# Patient Record
Sex: MALE | Race: WHITE | URBAN - METROPOLITAN AREA
[De-identification: names, ages, dates, MRNs, and addresses within clinical notes are randomized per-mention and may not be internally consistent; named-entity substitution may affect disease eponyms.]

---

## 2018-04-01 ASSESSMENT — MIFFLIN-ST. JEOR: SCORE: 2016.06

## 2018-04-02 ENCOUNTER — ANESTHESIA - HEALTHEAST (OUTPATIENT)
Dept: SURGERY | Facility: HOSPITAL | Age: 53
End: 2018-04-02

## 2018-04-02 ENCOUNTER — SURGERY - HEALTHEAST (OUTPATIENT)
Dept: SURGERY | Facility: HOSPITAL | Age: 53
End: 2018-04-02

## 2018-04-04 ENCOUNTER — COMMUNICATION - HEALTHEAST (OUTPATIENT)
Dept: ADMINISTRATIVE | Facility: CLINIC | Age: 53
End: 2018-04-04

## 2018-04-05 ENCOUNTER — HOME CARE/HOSPICE - HEALTHEAST (OUTPATIENT)
Dept: HOME HEALTH SERVICES | Facility: HOME HEALTH | Age: 53
End: 2018-04-05

## 2018-04-06 ENCOUNTER — HOME INFUSION (PRE-WILLOW HOME INFUSION) (OUTPATIENT)
Dept: PHARMACY | Facility: CLINIC | Age: 53
End: 2018-04-06

## 2018-04-06 ENCOUNTER — RECORDS - HEALTHEAST (OUTPATIENT)
Dept: ADMINISTRATIVE | Facility: OTHER | Age: 53
End: 2018-04-06

## 2018-04-09 ENCOUNTER — RECORDS - HEALTHEAST (OUTPATIENT)
Dept: ADMINISTRATIVE | Facility: OTHER | Age: 53
End: 2018-04-09

## 2018-04-09 ENCOUNTER — HOME INFUSION (PRE-WILLOW HOME INFUSION) (OUTPATIENT)
Dept: PHARMACY | Facility: CLINIC | Age: 53
End: 2018-04-09

## 2018-04-09 ENCOUNTER — INFUSION - HEALTHEAST (OUTPATIENT)
Dept: INFUSION THERAPY | Facility: CLINIC | Age: 53
End: 2018-04-09

## 2018-04-09 DIAGNOSIS — L03.115 CELLULITIS OF RIGHT LOWER EXTREMITY: ICD-10-CM

## 2018-04-09 LAB
ALBUMIN SERPL-MCNC: 3.3 G/DL (ref 3.5–5)
ALP SERPL-CCNC: 96 U/L (ref 45–120)
ALT SERPL W P-5'-P-CCNC: 52 U/L (ref 0–45)
ANION GAP SERPL CALCULATED.3IONS-SCNC: 9 MMOL/L (ref 5–18)
AST SERPL W P-5'-P-CCNC: 31 U/L (ref 0–40)
BASOPHILS # BLD AUTO: 0.1 THOU/UL (ref 0–0.2)
BASOPHILS NFR BLD AUTO: 1 % (ref 0–2)
BILIRUB DIRECT SERPL-MCNC: 0.1 MG/DL
BILIRUB SERPL-MCNC: 0.5 MG/DL (ref 0–1)
BUN SERPL-MCNC: 12 MG/DL (ref 8–22)
C REACTIVE PROTEIN LHE: 2.4 MG/DL (ref 0–0.8)
CALCIUM SERPL-MCNC: 9.5 MG/DL (ref 8.5–10.5)
CHLORIDE BLD-SCNC: 102 MMOL/L (ref 98–107)
CO2 SERPL-SCNC: 25 MMOL/L (ref 22–31)
CREAT SERPL-MCNC: 0.83 MG/DL (ref 0.7–1.3)
EOSINOPHIL # BLD AUTO: 0.2 THOU/UL (ref 0–0.4)
EOSINOPHIL NFR BLD AUTO: 4 % (ref 0–6)
ERYTHROCYTE [DISTWIDTH] IN BLOOD BY AUTOMATED COUNT: 12.4 % (ref 11–14.5)
GFR SERPL CREATININE-BSD FRML MDRD: >60 ML/MIN/1.73M2
GLUCOSE BLD-MCNC: 268 MG/DL (ref 70–125)
HCT VFR BLD AUTO: 35.3 % (ref 40–54)
HGB BLD-MCNC: 12.3 G/DL (ref 14–18)
LYMPHOCYTES # BLD AUTO: 1.1 THOU/UL (ref 0.8–4.4)
LYMPHOCYTES NFR BLD AUTO: 21 % (ref 20–40)
MCH RBC QN AUTO: 31.1 PG (ref 27–34)
MCHC RBC AUTO-ENTMCNC: 34.8 G/DL (ref 32–36)
MCV RBC AUTO: 89 FL (ref 80–100)
MONOCYTES # BLD AUTO: 0.6 THOU/UL (ref 0–0.9)
MONOCYTES NFR BLD AUTO: 12 % (ref 2–10)
NEUTROPHILS # BLD AUTO: 3.3 THOU/UL (ref 2–7.7)
NEUTROPHILS NFR BLD AUTO: 62 % (ref 50–70)
PLATELET # BLD AUTO: 211 THOU/UL (ref 140–440)
PMV BLD AUTO: 10.1 FL (ref 8.5–12.5)
POTASSIUM BLD-SCNC: 4.1 MMOL/L (ref 3.5–5)
PROT SERPL-MCNC: 7.1 G/DL (ref 6–8)
RBC # BLD AUTO: 3.96 MILL/UL (ref 4.4–6.2)
SODIUM SERPL-SCNC: 136 MMOL/L (ref 136–145)
VANCOMYCIN SERPL-MCNC: 9.6 UG/ML
WBC: 5.3 THOU/UL (ref 4–11)

## 2018-04-09 NOTE — PROGRESS NOTES
This is a recent snapshot of the patient's [facility] medical record.  For current drug dose and complete information and questions, call [phone] or In Basket pool, fv home infusion ([id number])  CSN Number:  [id number]

## 2018-04-10 NOTE — PROGRESS NOTES
This is a recent snapshot of the patient's [facility] medical record.  For current drug dose and complete information and questions, call [phone] or In Basket pool, fv home infusion ([id number])  CSN Number:  [id number]

## 2018-04-12 ENCOUNTER — INFUSION - HEALTHEAST (OUTPATIENT)
Dept: INFUSION THERAPY | Facility: CLINIC | Age: 53
End: 2018-04-12

## 2018-04-12 ENCOUNTER — COMMUNICATION - HEALTHEAST (OUTPATIENT)
Dept: INFECTIOUS DISEASES | Facility: CLINIC | Age: 53
End: 2018-04-12

## 2018-04-12 DIAGNOSIS — L03.90 CELLULITIS: ICD-10-CM

## 2018-04-12 LAB
ALBUMIN SERPL-MCNC: 3.6 G/DL (ref 3.5–5)
ALP SERPL-CCNC: 116 U/L (ref 45–120)
ALT SERPL W P-5'-P-CCNC: 38 U/L (ref 0–45)
ANION GAP SERPL CALCULATED.3IONS-SCNC: 12 MMOL/L (ref 5–18)
AST SERPL W P-5'-P-CCNC: 14 U/L (ref 0–40)
BASOPHILS # BLD AUTO: 0.1 THOU/UL (ref 0–0.2)
BASOPHILS NFR BLD AUTO: 1 % (ref 0–2)
BILIRUB DIRECT SERPL-MCNC: <0.1 MG/DL
BILIRUB SERPL-MCNC: 0.3 MG/DL (ref 0–1)
BUN SERPL-MCNC: 10 MG/DL (ref 8–22)
C REACTIVE PROTEIN LHE: 0.8 MG/DL (ref 0–0.8)
CALCIUM SERPL-MCNC: 9.9 MG/DL (ref 8.5–10.5)
CHLORIDE BLD-SCNC: 101 MMOL/L (ref 98–107)
CO2 SERPL-SCNC: 23 MMOL/L (ref 22–31)
CREAT SERPL-MCNC: 0.97 MG/DL (ref 0.7–1.3)
EOSINOPHIL # BLD AUTO: 0.2 THOU/UL (ref 0–0.4)
EOSINOPHIL NFR BLD AUTO: 4 % (ref 0–6)
ERYTHROCYTE [DISTWIDTH] IN BLOOD BY AUTOMATED COUNT: 12.2 % (ref 11–14.5)
GFR SERPL CREATININE-BSD FRML MDRD: >60 ML/MIN/1.73M2
GLUCOSE BLD-MCNC: 453 MG/DL (ref 70–125)
HCT VFR BLD AUTO: 39.5 % (ref 40–54)
HGB BLD-MCNC: 13.6 G/DL (ref 14–18)
LYMPHOCYTES # BLD AUTO: 1.1 THOU/UL (ref 0.8–4.4)
LYMPHOCYTES NFR BLD AUTO: 21 % (ref 20–40)
MCH RBC QN AUTO: 30.8 PG (ref 27–34)
MCHC RBC AUTO-ENTMCNC: 34.4 G/DL (ref 32–36)
MCV RBC AUTO: 90 FL (ref 80–100)
MONOCYTES # BLD AUTO: 0.5 THOU/UL (ref 0–0.9)
MONOCYTES NFR BLD AUTO: 10 % (ref 2–10)
NEUTROPHILS # BLD AUTO: 3.4 THOU/UL (ref 2–7.7)
NEUTROPHILS NFR BLD AUTO: 64 % (ref 50–70)
PLATELET # BLD AUTO: 255 THOU/UL (ref 140–440)
PMV BLD AUTO: 10.3 FL (ref 8.5–12.5)
POTASSIUM BLD-SCNC: 4.7 MMOL/L (ref 3.5–5)
PROT SERPL-MCNC: 7.6 G/DL (ref 6–8)
RBC # BLD AUTO: 4.41 MILL/UL (ref 4.4–6.2)
SODIUM SERPL-SCNC: 136 MMOL/L (ref 136–145)
VANCOMYCIN SERPL-MCNC: 14.6 UG/ML
WBC: 5.3 THOU/UL (ref 4–11)

## 2018-04-13 ENCOUNTER — HOME INFUSION (PRE-WILLOW HOME INFUSION) (OUTPATIENT)
Dept: PHARMACY | Facility: CLINIC | Age: 53
End: 2018-04-13

## 2018-04-16 ENCOUNTER — OFFICE VISIT - HEALTHEAST (OUTPATIENT)
Dept: PODIATRY | Facility: CLINIC | Age: 53
End: 2018-04-16

## 2018-04-16 DIAGNOSIS — E10.621 TYPE 1 DIABETES MELLITUS WITH RIGHT DIABETIC FOOT ULCER (H): ICD-10-CM

## 2018-04-16 DIAGNOSIS — L97.519 TYPE 1 DIABETES MELLITUS WITH RIGHT DIABETIC FOOT ULCER (H): ICD-10-CM

## 2018-04-17 NOTE — PROGRESS NOTES
This is a recent snapshot of the patient's [facility] medical record.  For current drug dose and complete information and questions, call [phone] or In Basket pool, fv home infusion ([id number])  CSN Number:  [id number]

## 2018-04-18 ENCOUNTER — RECORDS - HEALTHEAST (OUTPATIENT)
Dept: ADMINISTRATIVE | Facility: OTHER | Age: 53
End: 2018-04-18

## 2018-04-18 ENCOUNTER — OFFICE VISIT - HEALTHEAST (OUTPATIENT)
Dept: INFECTIOUS DISEASES | Facility: CLINIC | Age: 53
End: 2018-04-18

## 2018-04-18 ENCOUNTER — HOME INFUSION (PRE-WILLOW HOME INFUSION) (OUTPATIENT)
Dept: PHARMACY | Facility: CLINIC | Age: 53
End: 2018-04-18

## 2018-04-18 DIAGNOSIS — Z51.81 THERAPEUTIC DRUG MONITORING: ICD-10-CM

## 2018-04-18 DIAGNOSIS — M86.171 OSTEOMYELITIS OF FOOT, RIGHT, ACUTE (H): ICD-10-CM

## 2018-04-18 DIAGNOSIS — Z79.2 LONG TERM (CURRENT) USE OF ANTIBIOTICS: ICD-10-CM

## 2018-04-18 ASSESSMENT — MIFFLIN-ST. JEOR: SCORE: 2016.06

## 2018-04-19 ENCOUNTER — COMMUNICATION - HEALTHEAST (OUTPATIENT)
Dept: ADMINISTRATIVE | Facility: CLINIC | Age: 53
End: 2018-04-19

## 2018-04-19 ENCOUNTER — RECORDS - HEALTHEAST (OUTPATIENT)
Dept: ADMINISTRATIVE | Facility: OTHER | Age: 53
End: 2018-04-19

## 2018-04-19 ENCOUNTER — INFUSION - HEALTHEAST (OUTPATIENT)
Dept: INFUSION THERAPY | Facility: CLINIC | Age: 53
End: 2018-04-19

## 2018-04-19 DIAGNOSIS — L03.115 CELLULITIS OF RIGHT LOWER EXTREMITY: ICD-10-CM

## 2018-04-19 DIAGNOSIS — L03.90 CELLULITIS: ICD-10-CM

## 2018-04-19 LAB
ALBUMIN SERPL-MCNC: 3.7 G/DL (ref 3.5–5)
ALP SERPL-CCNC: 123 U/L (ref 45–120)
ALT SERPL W P-5'-P-CCNC: 30 U/L (ref 0–45)
ANION GAP SERPL CALCULATED.3IONS-SCNC: 14 MMOL/L (ref 5–18)
AST SERPL W P-5'-P-CCNC: 12 U/L (ref 0–40)
BASOPHILS # BLD AUTO: 0.1 THOU/UL (ref 0–0.2)
BASOPHILS NFR BLD AUTO: 2 % (ref 0–2)
BILIRUB DIRECT SERPL-MCNC: <0.1 MG/DL
BILIRUB SERPL-MCNC: 0.4 MG/DL (ref 0–1)
BUN SERPL-MCNC: 14 MG/DL (ref 8–22)
C REACTIVE PROTEIN LHE: 0.4 MG/DL (ref 0–0.8)
CALCIUM SERPL-MCNC: 9.7 MG/DL (ref 8.5–10.5)
CHLORIDE BLD-SCNC: 101 MMOL/L (ref 98–107)
CO2 SERPL-SCNC: 20 MMOL/L (ref 22–31)
CREAT SERPL-MCNC: 0.96 MG/DL (ref 0.7–1.3)
EOSINOPHIL # BLD AUTO: 0.3 THOU/UL (ref 0–0.4)
EOSINOPHIL NFR BLD AUTO: 6 % (ref 0–6)
ERYTHROCYTE [DISTWIDTH] IN BLOOD BY AUTOMATED COUNT: 12.1 % (ref 11–14.5)
GFR SERPL CREATININE-BSD FRML MDRD: >60 ML/MIN/1.73M2
GLUCOSE BLD-MCNC: 415 MG/DL (ref 70–125)
HCT VFR BLD AUTO: 40.1 % (ref 40–54)
HGB BLD-MCNC: 14.2 G/DL (ref 14–18)
LYMPHOCYTES # BLD AUTO: 1.2 THOU/UL (ref 0.8–4.4)
LYMPHOCYTES NFR BLD AUTO: 23 % (ref 20–40)
MCH RBC QN AUTO: 31.1 PG (ref 27–34)
MCHC RBC AUTO-ENTMCNC: 35.4 G/DL (ref 32–36)
MCV RBC AUTO: 88 FL (ref 80–100)
MONOCYTES # BLD AUTO: 0.5 THOU/UL (ref 0–0.9)
MONOCYTES NFR BLD AUTO: 9 % (ref 2–10)
NEUTROPHILS # BLD AUTO: 3.3 THOU/UL (ref 2–7.7)
NEUTROPHILS NFR BLD AUTO: 61 % (ref 50–70)
PLATELET # BLD AUTO: 240 THOU/UL (ref 140–440)
PMV BLD AUTO: 10.5 FL (ref 8.5–12.5)
POTASSIUM BLD-SCNC: 4.1 MMOL/L (ref 3.5–5)
PROT SERPL-MCNC: 7.6 G/DL (ref 6–8)
RBC # BLD AUTO: 4.56 MILL/UL (ref 4.4–6.2)
SODIUM SERPL-SCNC: 135 MMOL/L (ref 136–145)
VANCOMYCIN SERPL-MCNC: 14 UG/ML
WBC: 5.5 THOU/UL (ref 4–11)

## 2018-04-19 NOTE — PROGRESS NOTES
This is a recent snapshot of the patient's [facility] medical record.  For current drug dose and complete information and questions, call [phone] or In [facility] pool, fv home infusion ([id number])  CSN Number:  [id number]

## 2018-04-20 ENCOUNTER — RECORDS - HEALTHEAST (OUTPATIENT)
Dept: ADMINISTRATIVE | Facility: OTHER | Age: 53
End: 2018-04-20

## 2018-04-20 ENCOUNTER — HOME INFUSION (PRE-WILLOW HOME INFUSION) (OUTPATIENT)
Dept: PHARMACY | Facility: CLINIC | Age: 53
End: 2018-04-20

## 2018-04-23 NOTE — PROGRESS NOTES
This is a recent snapshot of the patient's [facility] medical record.  For current drug dose and complete information and questions, call [phone] or In [facility] pool, fv home infusion ([id number])  CSN Number:  [id number]

## 2018-04-26 ENCOUNTER — INFUSION - HEALTHEAST (OUTPATIENT)
Dept: INFUSION THERAPY | Facility: CLINIC | Age: 53
End: 2018-04-26

## 2018-04-26 DIAGNOSIS — L03.115 CELLULITIS OF RIGHT LEG: ICD-10-CM

## 2018-04-26 LAB
ALBUMIN SERPL-MCNC: 3.9 G/DL (ref 3.5–5)
ALP SERPL-CCNC: 113 U/L (ref 45–120)
ALT SERPL W P-5'-P-CCNC: 22 U/L (ref 0–45)
ANION GAP SERPL CALCULATED.3IONS-SCNC: 12 MMOL/L (ref 5–18)
AST SERPL W P-5'-P-CCNC: 13 U/L (ref 0–40)
BASOPHILS # BLD AUTO: 0.1 THOU/UL (ref 0–0.2)
BASOPHILS NFR BLD AUTO: 1 % (ref 0–2)
BILIRUB DIRECT SERPL-MCNC: <0.1 MG/DL
BILIRUB SERPL-MCNC: 0.4 MG/DL (ref 0–1)
BUN SERPL-MCNC: 13 MG/DL (ref 8–22)
C REACTIVE PROTEIN LHE: 0.4 MG/DL (ref 0–0.8)
CALCIUM SERPL-MCNC: 10.1 MG/DL (ref 8.5–10.5)
CHLORIDE BLD-SCNC: 102 MMOL/L (ref 98–107)
CO2 SERPL-SCNC: 19 MMOL/L (ref 22–31)
CREAT SERPL-MCNC: 0.89 MG/DL (ref 0.7–1.3)
EOSINOPHIL # BLD AUTO: 0.3 THOU/UL (ref 0–0.4)
EOSINOPHIL NFR BLD AUTO: 4 % (ref 0–6)
ERYTHROCYTE [DISTWIDTH] IN BLOOD BY AUTOMATED COUNT: 12.3 % (ref 11–14.5)
GFR SERPL CREATININE-BSD FRML MDRD: >60 ML/MIN/1.73M2
GLUCOSE BLD-MCNC: 324 MG/DL (ref 70–125)
HCT VFR BLD AUTO: 43.9 % (ref 40–54)
HGB BLD-MCNC: 15.4 G/DL (ref 14–18)
LYMPHOCYTES # BLD AUTO: 1.7 THOU/UL (ref 0.8–4.4)
LYMPHOCYTES NFR BLD AUTO: 24 % (ref 20–40)
MCH RBC QN AUTO: 31.1 PG (ref 27–34)
MCHC RBC AUTO-ENTMCNC: 35.1 G/DL (ref 32–36)
MCV RBC AUTO: 89 FL (ref 80–100)
MONOCYTES # BLD AUTO: 0.7 THOU/UL (ref 0–0.9)
MONOCYTES NFR BLD AUTO: 11 % (ref 2–10)
NEUTROPHILS # BLD AUTO: 4.2 THOU/UL (ref 2–7.7)
NEUTROPHILS NFR BLD AUTO: 60 % (ref 50–70)
PLATELET # BLD AUTO: 255 THOU/UL (ref 140–440)
PMV BLD AUTO: 10.4 FL (ref 8.5–12.5)
POTASSIUM BLD-SCNC: 4.7 MMOL/L (ref 3.5–5)
PROT SERPL-MCNC: 7.8 G/DL (ref 6–8)
RBC # BLD AUTO: 4.95 MILL/UL (ref 4.4–6.2)
SODIUM SERPL-SCNC: 133 MMOL/L (ref 136–145)
VANCOMYCIN SERPL-MCNC: 18.1 UG/ML
WBC: 7 THOU/UL (ref 4–11)

## 2018-04-27 ENCOUNTER — HOME INFUSION (PRE-WILLOW HOME INFUSION) (OUTPATIENT)
Dept: PHARMACY | Facility: CLINIC | Age: 53
End: 2018-04-27

## 2018-04-30 ENCOUNTER — AMBULATORY - HEALTHEAST (OUTPATIENT)
Dept: INFECTIOUS DISEASES | Facility: CLINIC | Age: 53
End: 2018-04-30

## 2018-04-30 ENCOUNTER — INFUSION - HEALTHEAST (OUTPATIENT)
Dept: INFUSION THERAPY | Facility: CLINIC | Age: 53
End: 2018-04-30

## 2018-04-30 DIAGNOSIS — M86.9 OSTEOMYELITIS, UNSPECIFIED SITE, UNSPECIFIED TYPE (H): ICD-10-CM

## 2018-04-30 DIAGNOSIS — L03.115 CELLULITIS OF RIGHT LEG: ICD-10-CM

## 2018-05-03 ENCOUNTER — OFFICE VISIT - HEALTHEAST (OUTPATIENT)
Dept: VASCULAR SURGERY | Facility: CLINIC | Age: 53
End: 2018-05-03

## 2018-05-03 DIAGNOSIS — L97.511 DIABETIC ULCER OF RIGHT FOOT ASSOCIATED WITH TYPE 1 DIABETES MELLITUS, LIMITED TO BREAKDOWN OF SKIN, UNSPECIFIED PART OF FOOT (H): ICD-10-CM

## 2018-05-03 DIAGNOSIS — E10.621 DIABETIC ULCER OF RIGHT FOOT ASSOCIATED WITH TYPE 1 DIABETES MELLITUS, LIMITED TO BREAKDOWN OF SKIN, UNSPECIFIED PART OF FOOT (H): ICD-10-CM

## 2018-05-03 NOTE — PROGRESS NOTES
This is a recent snapshot of the patient's [facility] medical record.  For current drug dose and complete information and questions, call [phone] or In Basket pool, fv home infusion ([id number])  CSN Number:  [id number]

## 2018-05-04 ENCOUNTER — COMMUNICATION - HEALTHEAST (OUTPATIENT)
Dept: ADMINISTRATIVE | Facility: CLINIC | Age: 53
End: 2018-05-04

## 2018-05-04 ENCOUNTER — COMMUNICATION - HEALTHEAST (OUTPATIENT)
Dept: VASCULAR SURGERY | Facility: CLINIC | Age: 53
End: 2018-05-04

## 2018-05-08 ENCOUNTER — RECORDS - HEALTHEAST (OUTPATIENT)
Dept: ADMINISTRATIVE | Facility: OTHER | Age: 53
End: 2018-05-08

## 2018-05-08 ENCOUNTER — HOME INFUSION (PRE-WILLOW HOME INFUSION) (OUTPATIENT)
Dept: PHARMACY | Facility: CLINIC | Age: 53
End: 2018-05-08

## 2018-05-09 ENCOUNTER — HOME INFUSION (PRE-WILLOW HOME INFUSION) (OUTPATIENT)
Dept: PHARMACY | Facility: CLINIC | Age: 53
End: 2018-05-09

## 2018-05-10 ENCOUNTER — HOME INFUSION (PRE-WILLOW HOME INFUSION) (OUTPATIENT)
Dept: PHARMACY | Facility: CLINIC | Age: 53
End: 2018-05-10

## 2018-05-10 NOTE — PROGRESS NOTES
This is a recent snapshot of the patient's [facility] medical record.  For current drug dose and complete information and questions, call [phone] or In Basket pool, fv home infusion ([id number])  CSN Number:  [id number]

## 2018-05-10 NOTE — PROGRESS NOTES
This is a recent snapshot of the patient's [facility] medical record.  For current drug dose and complete information and questions, call [phone] or In Basket pool, fv home infusion ([id number])  CSN Number:  [id number]

## 2018-05-14 ENCOUNTER — HOME INFUSION (PRE-WILLOW HOME INFUSION) (OUTPATIENT)
Dept: PHARMACY | Facility: CLINIC | Age: 53
End: 2018-05-14

## 2018-05-15 ENCOUNTER — COMMUNICATION - HEALTHEAST (OUTPATIENT)
Dept: PODIATRY | Facility: CLINIC | Age: 53
End: 2018-05-15

## 2018-05-15 ENCOUNTER — HOME INFUSION (PRE-WILLOW HOME INFUSION) (OUTPATIENT)
Dept: PHARMACY | Facility: CLINIC | Age: 53
End: 2018-05-15

## 2018-05-15 NOTE — PROGRESS NOTES
This is a recent snapshot of the patient's [facility] medical record.  For current drug dose and complete information and questions, call [phone] or In Basket pool, fv home infusion ([id number])  CSN Number:  [id number]

## 2018-05-16 NOTE — PROGRESS NOTES
This is a recent snapshot of the patient's [facility] medical record.  For current drug dose and complete information and questions, call [phone] or In [facility] pool, fv home infusion ([id number])  CSN Number:  [id number]

## 2018-06-01 NOTE — PROGRESS NOTES
This is a recent snapshot of the patient's [facility] medical record.  For current drug dose and complete information and questions, call [phone] or In Basket pool, fv home infusion ([id number])  CSN Number:  [id number]

## 2021-06-01 VITALS — HEIGHT: 74 IN | BODY MASS INDEX: 31.44 KG/M2 | WEIGHT: 245 LBS

## 2021-06-01 VITALS — HEIGHT: 74 IN | WEIGHT: 241.4 LBS | BODY MASS INDEX: 30.98 KG/M2

## 2021-06-17 NOTE — PROGRESS NOTES
Pt presented for lab draw from his picc line. Pt had recent hospitalization for cellulitis in his right lower extremity. Pt had picc drsg change on Thursday. Pt paperwork he brought with him from his discharge from the hospital.  showed labs to be drawn on Monday and thursdays then faxd to [facility] and dr cortez. Our paper work from [facility] infusion shows only mondays. Pt picc drsg not changed today since not due yet. Pt made appt for Thursday for additional labs and picc drsg change then. Will place call to dr cortez office to clarify frequency of labs. Lucia Cline RN

## 2021-06-17 NOTE — ANESTHESIA PREPROCEDURE EVALUATION
Anesthesia Evaluation      Patient summary reviewed     Airway   Mallampati: II   Pulmonary - normal exam   (-) not a smoker                         Cardiovascular - normal exam  ECG reviewed        Neuro/Psych      Comments: neuropathy    Endo/Other    (+) diabetes mellitus,      GI/Hepatic/Renal       Other findings: Hb 13.6, K 4.5      Dental - normal exam                        Anesthesia Plan  Planned anesthetic: MAC    ASA 3     Anesthetic plan and risks discussed with: patient    Post-op plan: routine recovery

## 2021-06-17 NOTE — ANESTHESIA CARE TRANSFER NOTE
Last vitals:   Vitals:    04/02/18 1750   BP: 113/65   Pulse: 95   Resp: 20   Temp: 36.8  C (98.3  F)   SpO2: 94%     Patient's level of consciousness is drowsy  Spontaneous respirations: yes  Maintains airway independently: yes  Dentition unchanged: yes  Oropharynx: oropharynx clear of all foreign objects    QCDR Measures:  ASA# 20 - Surgical Safety Checklist: WHO surgical safety checklist completed prior to induction  PQRS# 430 - Adult PONV Prevention: 4558F-1P - Medical reason for NOT administering combination therapy  ASA# 8 - Peds PONV Prevention: NA - Not pediatric patient, not GA or 2 or more risk factors NOT present  PQRS# 424 - Fozia-op Temp Management: 4559F - At least one body temp DOCUMENTED => 35.5C or 95.9F within required timeframe  PQRS# 426 - PACU Transfer Protocol: - Transfer of care checklist used  ASA# 14 - Acute Post-op Pain: ASA14B - Patient did NOT experience pain >= 7 out of 10

## 2021-06-17 NOTE — PROGRESS NOTES
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 Residual osteo very possible.  cx with mssa enterococcus strep   Uncontrolled diabetes mellitus with neuropathy           Plan:     Continue IV vancomycin x at least 4 weeks postop ending 4/29; this is most convenient for him, very active in his job  Dosing adjusted per [place] pharmacist. I prefer we do not exceed current dose;  Discussed w [facility] pharmacist  Then oral augmentin x 2 more weeks- ordered  Running high.  I instructed the patient to continue to follow his primary doctor regarding aggressive diabetes control    She Ley MD  [facility]  On-Call ID provider: [phone], option: 9      Present Illness:   52 years old male with right foot osteomyelitis status post toe disarticulation presenting for hospital follow-up.  Has been on IV vancomycin to cover 3 different organism as listed above.  He has been tolerating well the he complains a little about the long infusion necessary.  I see that [facility] infusion recently upped his dose to 2.2 g every 12 hours.  Patient is tolerating the antibiotic fine no ringing in the ears no hearing issues no rash itching diarrhea.  Stitches were taken out.  No purulent drainage.  Apparently applying Neosporin to tiny groin area.    Review of Systems  Performed and all negative except as mentioned above.    Past medical, family, and social history reviewed, unchanged from before.        Physical Exam:     General Appearance:  Alert, cooperative, no distress, appears stated age    Head:  Normocephalic, without obvious abnormality,  atraumatic   Neck no stiffness or adenopathy  Eyes no conjunctivitis or icterus   Oral cavity no thrush , ulcers or exudates                    Extremities:   Right foot healed incision except for distal tiny granulating area of inflammation no cellulitis   Skin:  Skin color, texture, turgor normal, no rashes or lesions    Neurologic:  Alert and oriented X 3, Moves all 4 extremities        DATA     Results for orders placed or performed in visit on 04/12/18   Hepatic Profile   Result Value Ref Range    Bilirubin, Total 0.3 0.0 - 1.0 mg/dL    Bilirubin, Direct <0.1 <=0.5 mg/dL    Protein, Total 7.6 6.0 - 8.0 g/dL    Albumin 3.6 3.5 - 5.0 g/dL    Alkaline Phosphatase 116 45 - 120 U/L    AST 14 0 - 40 U/L    ALT 38 0 - 45 U/L   C-Reactive Protein (CRP)   Result Value Ref Range    CRP 0.8 0.0 - 0.8 mg/dL   Vancomycin (Vancocin )   Result Value Ref Range    Vancomycin 14.6 <=25.0 ug/mL   Basic Metabolic Panel   Result Value Ref Range    Sodium 136 136 - 145 mmol/L    Potassium 4.7 3.5 - 5.0 mmol/L    Chloride 101 98 - 107 mmol/L    CO2 23 22 - 31 mmol/L    Anion Gap, Calculation 12 5 - 18 mmol/L    Glucose 453 (HH) 70 - 125 mg/dL    Calcium 9.9 8.5 - 10.5 mg/dL    BUN 10 8 - 22 mg/dL    Creatinine 0.97 0.70 - 1.30 mg/dL    GFR MDRD Af Amer >60 >60 mL/min/1.73m2    GFR MDRD Non Af Amer >60 >60 mL/min/1.73m2   HM1 (CBC with Diff)   Result Value Ref Range    WBC 5.3 4.0 - 11.0 thou/uL    RBC 4.41 4.40 - 6.20 mill/uL    Hemoglobin 13.6 (L) 14.0 - 18.0 g/dL    Hematocrit 39.5 (L) 40.0 - 54.0 %    MCV 90 80 - 100 fL    MCH 30.8 27.0 - 34.0 pg    MCHC 34.4 32.0 - 36.0 g/dL    RDW 12.2 11.0 - 14.5 %    Platelets 255 140 - 440 thou/uL    MPV 10.3 8.5 - 12.5 fL    Neutrophils % 64 50 - 70 %    Lymphocytes % 21 20 - 40 %    Monocytes % 10 2 - 10 %    Eosinophils % 4 0 - 6 %    Basophils % 1 0 - 2 %    Neutrophils Absolute 3.4 2.0 - 7.7 thou/uL    Lymphocytes Absolute 1.1 0.8 - 4.4 thou/uL    Monocytes Absolute 0.5 0.0 - 0.9 thou/uL     Eosinophils Absolute 0.2 0.0 - 0.4 thou/uL    Basophils Absolute 0.1 0.0 - 0.2 thou/uL         She Ley MD

## 2021-06-17 NOTE — PROGRESS NOTES
FOOT AND ANKLE SURGERY/PODIATRY Progress Note        ASSESSMENT:   Diabetic Ulceration right foot  Fissure left foot       TREATMENT:  -Full thickness ulceration plantar 5th MPJ right foot. According to the patient he noticed this sore two days ago. No signs of infection. I discussed the principles of wound healing today including the importance of limited walking on the involved limb, good vascular perfusion, good glycemic control and the absence of infection.   -The patient works on his feet. Work note dispensed today to remain out of work x6 weeks. Recommend complete non-weight bearing on the right foot with existing crutches. CAM boot for stabilization.   -Will obtain custom diabetic inserts and shoes after right foot sore has resolved. Inserts to help reduce chronic pressure sub 1st and 5th metatarsal heads.   -Sharp, excisional debridement of the wound into subcutaneous tissue was performed using #15 blade for a total of 0.25 square centimeters. Debridement was done to reduce pressure, remove non-viable, devitalized tissue and promote wound healing. Patient tolerated this well. A gauze dressing with endoform was applied. He will re-apply endoform as directed with a gauze dressing.   -Rx LacHydrin cream bid to the left foot.   -He will follow-up with me in two weeks.      Darrian Renteria, HÉCTOR  [facility]      HPI: Brett BETHANY Jacobo was seen today at the request of Dr. Barillas to discuss offloading options for both feet. The patient noticed a new sore on the plantar right foot two days ago. He has a history of foot amputations, starting with amputation of the right hallux one year ago. The 2nd digit right foot was amputated on April 2, 2018. He is a poorly controlled type 1 diabetic. Admits to using smokeless tobacco. Currently walking in gym shoes. 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 This requires him to stand the majority of the day. Admits to 5th metatarsal  head removal right foot.     Past Medical History:   Diagnosis Date     Diabetes mellitus      Neuropathy        Allergies   Allergen Reactions     Ibuprofen Swelling     Naproxen Swelling         Current Outpatient Prescriptions:      acetaminophen (TYLENOL) 325 MG tablet, Take 2 tablets (650 mg total) by mouth every 4 (four) hours as needed. (Patient taking differently: Take 500 mg by mouth every 4 (four) hours as needed. ), Disp: , Rfl: 0     amoxicillin-clavulanate (AUGMENTIN) 875-125 mg per tablet, Take 1 tablet by mouth 2 (two) times a day for 14 days., Disp: 28 tablet, Rfl: 0     ascorbic acid, vitamin C, (VITAMIN C) 500 MG tablet, Take 500 mg by mouth daily., Disp: , Rfl:      aspirin 325 MG EC tablet, Take 650 mg by mouth 3 (three) times a day., Disp: , Rfl:      BASAGLAR KWIKPEN U-100 INSULIN 100 unit/mL (3 mL) pen, Take 25 Units by mouth 2 (two) times a day. , Disp: , Rfl:      gabapentin (NEURONTIN) 300 MG capsule, Take 300 mg by mouth 3 (three) times a day., Disp: , Rfl:      insulin aspart U-100 (NOVOLOG) 100 unit/mL injection, Inject 0-12 Units under the skin 3 (three) times a day before meals., Disp: , Rfl:      insulin aspart U-100 (NOVOLOG) 100 unit/mL injection, Inject 0-12 Units under the skin 2 (two) times a day as needed for high blood sugar (snacks)., Disp: , Rfl:      metFORMIN (GLUCOPHAGE) 1000 MG tablet, Take 1,000 mg by mouth 2 (two) times a day with meals., Disp: , Rfl:      multivitamin (ONE A DAY) per tablet, Take 1 tablet by mouth daily., Disp: , Rfl:      omeprazole (PRILOSEC) 20 MG capsule, Take 20 mg by mouth Daily before breakfast., Disp: , Rfl:      polyethylene glycol (MIRALAX) 17 gram packet, Take 1 packet (17 g total) by mouth daily as needed (constipation)., Disp: , Rfl: 0     senna (SENOKOT) 8.6 mg tablet, Take 1 tablet by mouth 2 (two) times a day. For constipation, hold if loose stools, Disp: 30 tablet, Rfl: 0     vancomycin 1.75 g in sodium chloride 0.9% 500 mL IVPB,  Infuse 1.75 g into a venous catheter every 12 (twelve) hours. UNTIL 4/29 WEEKLY CBC BMP CRP VANCO TROUGH FAX DR JOAQUIN, Disp: 1 each, Rfl: 0    Past Surgical History:   Procedure Laterality Date     HERNIA REPAIR       OTHER SURGICAL HISTORY  08/23/2016    RIGHT FOOT 5TH METATARSAL HEAD REMOVAL AND DM NEUROPATHIC DEBRIDEMENT     PIC  4/5/2018          NC I&D SOFT TISSUE ABSCESS SUBFASCIAL Bilateral 4/2/2018    Procedure: Bilateral foot and toe Incision and Drainage with right 2nd toe amputation.;  Surgeon: Brannon Barillas DPM;  Location: [facility];  Service: Podiatry     TONSILLECTOMY AND ADENOIDECTOMY         Social History     Social History Narrative       Family History   Problem Relation Age of Onset     Heart disease Brother      Acute Myocardial Infarction Brother      COPD Mother      No Medical Problems Father        Review of Systems - Negative      OBJECTIVE:  Appearance: alert, well appearing, and in no distress.    There were no vitals filed for this visit.    Vascular: Dorsalis pedis palpableBilateral.  Dermatologic:   Wound 05/03/18 Right Second toe  (Active)   Pre Size Length 2 5/3/2018  2:00 PM   Pre Size Width 0.3 5/3/2018  2:00 PM       Wound 05/03/18 Left planter lateral  (Active)   Pre Size Length 5.5 5/3/2018  2:00 PM   Pre Size Width 0.1 5/3/2018  2:00 PM       Wound 05/03/18 right planter lateral  (Active)   Pre Size Length 0.5 5/3/2018  2:00 PM   Pre Size Width 0.5 5/3/2018  2:00 PM   Undermined .3cm at 12oclock to 12 5/3/2018  2:00 PM   Tunneling 1inch 5/3/2018  2:00 PM       Wound 05/03/18 Left Great toe planter  (Active)   Pre Size Length 2.7 5/3/2018  2:00 PM   Pre Size Width 3 5/3/2018  2:00 PM       Wound 04/01/18 Non-pressure related ulcer Foot Right (Active)       Wound 04/01/18 Other wound type (comment) Foot Left (Active)       Incision 04/02/18 Foot Left (Active)       Incision 04/02/18 Foot Right (Active)   Granular base right foot ulceration, does not probe to deep  tissues. No erythema bilateral feet. Dry, flaky skin plantar feet. Superficial fissuring plantar left foot. Thinning of the skin with blood staining along the plantar 1st MPJ left foot.   Neurologic: Diminished to light touch Bilateral.  Musculoskeletal: Contracted digits noted Bilateral. Amputated digits 1,2 right foot.     Imaging: None    Picture: None

## 2021-06-17 NOTE — PROGRESS NOTES
Pt admitted per pedaram for his lab draw and dressing change. Pt states he isn;'t taking his regular insulin since he can't afford the co pay. Pt states it is 50dollars and doesn't have any money since he cant work right now. Another pt overheard this conversation and requested rn to give pt his 50$. Pt very appreciative and will  his insulin.Labs will be faxed to [facility]

## 2021-06-17 NOTE — ANESTHESIA POSTPROCEDURE EVALUATION
Patient: Brett Jacobo  Bilateral foot and toe Incision and Drainage with right 2nd toe amputation.  Anesthesia type: MAC    Patient location: PACU  Last vitals:   Vitals:    04/02/18 1810   BP: 124/82   Pulse: 90   Resp: 16   Temp: 37.1  C (98.8  F)   SpO2: 95%     Post vital signs: stable  Level of consciousness: awake and responds to simple questions  Post-anesthesia pain: pain controlled  Post-anesthesia nausea and vomiting: no  Pulmonary: unassisted, return to baseline  Cardiovascular: stable and blood pressure at baseline  Hydration: adequate  Anesthetic events: no    QCDR Measures:  ASA# 11 - Fozia-op Cardiac Arrest: ASA11B - Patient did NOT experience unanticipated cardiac arrest  ASA# 12 - Fozia-op Mortality Rate: ASA12B - Patient did NOT die  ASA# 13 - PACU Re-Intubation Rate: ASA13B - Patient did NOT require a new airway mgmt  ASA# 10 - Composite Anes Safety: ASA10A - No serious adverse event    Additional Notes:

## 2021-06-17 NOTE — PROGRESS NOTES
augmentin XR too costly for pt.  I ordered regular augmentin x 2 weeks.  Attempted calling [employer] contact, no answer.

## 2021-06-17 NOTE — PROGRESS NOTES
Assessment: 12 days status post bilateral incision and drainage with right second toe amputation.    Plan: Sutures removed from right toe amputation site today.  That wound has stayed closed.  Ulceration on the right foot near the fifth metatarsal head has hyperkeratosis.  Fairly superficial without purulence.  Left foot continues to be fragile.  IV antibiotics continue because of the threat of osteomyelitis in the second metatarsal head.  I would like him to be seen in the vascular clinic for ongoing wound care and better offloading.  His standing job will make this a challenge.      Subjective: The patient returns to the [facility] for a first postoperative visit. Surgery was 12 days ago on April 4, 2018 at [facility]. The patient denies any falls or new injuries.  The right great toe was amputated last year.  I amputated the second toe this month.  Both feet are prone to ulceration.  Poorly controlled diabetic with a standing job.  Considerable neuropathic pain despite accommodative orthotics.  He was discharged from the hospital after toe amputation with IV antibiotics managed through [place] [facility].    Objective: Incision on the right foot looks good today.  Sutures were removed without difficulty.  No gapping.  No erythema or edema.  Plantar wound near the right fifth metatarsal head measures 1 cm in diameter with good granular base and surrounding hyperkeratosis.  Left foot continues to produce significant callusing around the fissure near the lesser metatarsals.

## 2021-06-17 NOTE — PROGRESS NOTES
Pt admitted per pedis for weekly lab draw and dressing change. Pt states he had a recent toe amputation and is recoverring with iv antibiotics until April 29th. Dressing change done and labs drawn and results will be called to clinic.Site non red and non inflamed.

## 2021-06-17 NOTE — PROGRESS NOTES
Received a call from lab today to report a blood sugar of 453 this morning.  Called Dr. Silva clinic and left message with his team, they will follow up with patient.  Also called patient and left message to return my call and that Dr. Silva office will be calling him.

## 2021-07-03 NOTE — ADDENDUM NOTE
Addendum Note by Alejandrina Galvez at 4/19/2018  3:03 PM     Author: Alejandrina Galvez Service: -- Author Type:     Filed: 4/19/2018  3:03 PM Encounter Date: 4/19/2018 Status: Signed    : Alejandrina Galvez ()    Addended by: ALEJANDRINA GALVEZ on: 4/19/2018 03:03 PM        Modules accepted: Orders

## 2021-08-03 PROBLEM — M86.171 ACUTE OSTEOMYELITIS OF TOE, RIGHT (H): Status: RESOLVED | Noted: 2018-04-01 | Resolved: 2018-04-16

## 2022-04-20 ENCOUNTER — LAB REQUISITION (OUTPATIENT)
Dept: LAB | Facility: CLINIC | Age: 57
End: 2022-04-20

## 2022-04-20 DIAGNOSIS — Z11.1 ENCOUNTER FOR SCREENING FOR RESPIRATORY TUBERCULOSIS: ICD-10-CM

## 2022-04-21 PROCEDURE — 86481 TB AG RESPONSE T-CELL SUSP: CPT | Performed by: NURSE PRACTITIONER

## 2022-04-21 PROCEDURE — P9604 ONE-WAY ALLOW PRORATED TRIP: HCPCS | Performed by: NURSE PRACTITIONER

## 2022-04-21 PROCEDURE — 36415 COLL VENOUS BLD VENIPUNCTURE: CPT | Performed by: NURSE PRACTITIONER

## 2022-04-23 ENCOUNTER — LAB REQUISITION (OUTPATIENT)
Dept: LAB | Facility: CLINIC | Age: 57
End: 2022-04-23

## 2022-04-23 DIAGNOSIS — E11.9 TYPE 2 DIABETES MELLITUS WITHOUT COMPLICATIONS (H): ICD-10-CM

## 2022-04-24 LAB
GAMMA INTERFERON BACKGROUND BLD IA-ACNC: 0.01 IU/ML
M TB IFN-G BLD-IMP: ABNORMAL
M TB IFN-G CD4+ BCKGRND COR BLD-ACNC: 0.23 IU/ML
MITOGEN IGNF BCKGRD COR BLD-ACNC: 0 IU/ML
MITOGEN IGNF BCKGRD COR BLD-ACNC: 0 IU/ML
QUANTIFERON MITOGEN: 0.24 IU/ML
QUANTIFERON NIL TUBE: 0.01 IU/ML
QUANTIFERON TB1 TUBE: 0.01 IU/ML
QUANTIFERON TB2 TUBE: 0.01

## 2022-04-26 ENCOUNTER — LAB REQUISITION (OUTPATIENT)
Dept: LAB | Facility: CLINIC | Age: 57
End: 2022-04-26

## 2022-04-26 DIAGNOSIS — R76.12 NONSPECIFIC REACTION TO CELL MEDIATED IMMUNITY MEASUREMENT OF GAMMA INTERFERON ANTIGEN RESPONSE WITHOUT ACTIVE TUBERCULOSIS: ICD-10-CM

## 2022-04-26 LAB
ANION GAP SERPL CALCULATED.3IONS-SCNC: 9 MMOL/L (ref 5–18)
BUN SERPL-MCNC: 20 MG/DL (ref 8–22)
CALCIUM SERPL-MCNC: 9.7 MG/DL (ref 8.5–10.5)
CHLORIDE BLD-SCNC: 101 MMOL/L (ref 98–107)
CO2 SERPL-SCNC: 28 MMOL/L (ref 22–31)
CREAT SERPL-MCNC: 1.54 MG/DL (ref 0.7–1.3)
ERYTHROCYTE [DISTWIDTH] IN BLOOD BY AUTOMATED COUNT: 18.7 % (ref 10–15)
GFR SERPL CREATININE-BSD FRML MDRD: 53 ML/MIN/1.73M2
GLUCOSE BLD-MCNC: 143 MG/DL (ref 70–125)
HCT VFR BLD AUTO: 27.8 % (ref 40–53)
HGB BLD-MCNC: 8 G/DL (ref 13.3–17.7)
MCH RBC QN AUTO: 25.4 PG (ref 26.5–33)
MCHC RBC AUTO-ENTMCNC: 28.8 G/DL (ref 31.5–36.5)
MCV RBC AUTO: 88 FL (ref 78–100)
PLATELET # BLD AUTO: 413 10E3/UL (ref 150–450)
POTASSIUM BLD-SCNC: 4.5 MMOL/L (ref 3.5–5)
RBC # BLD AUTO: 3.15 10E6/UL (ref 4.4–5.9)
SODIUM SERPL-SCNC: 138 MMOL/L (ref 136–145)
WBC # BLD AUTO: 7 10E3/UL (ref 4–11)

## 2022-04-26 PROCEDURE — 85027 COMPLETE CBC AUTOMATED: CPT | Performed by: INTERNAL MEDICINE

## 2022-04-26 PROCEDURE — 80048 BASIC METABOLIC PNL TOTAL CA: CPT | Performed by: INTERNAL MEDICINE

## 2022-04-26 PROCEDURE — 36415 COLL VENOUS BLD VENIPUNCTURE: CPT | Performed by: INTERNAL MEDICINE

## 2022-04-26 PROCEDURE — P9604 ONE-WAY ALLOW PRORATED TRIP: HCPCS | Performed by: INTERNAL MEDICINE

## 2022-04-28 PROCEDURE — P9604 ONE-WAY ALLOW PRORATED TRIP: HCPCS | Performed by: NURSE PRACTITIONER

## 2022-04-28 PROCEDURE — 36415 COLL VENOUS BLD VENIPUNCTURE: CPT | Performed by: NURSE PRACTITIONER

## 2022-04-28 PROCEDURE — 86481 TB AG RESPONSE T-CELL SUSP: CPT | Performed by: NURSE PRACTITIONER

## 2022-04-29 LAB
GAMMA INTERFERON BACKGROUND BLD IA-ACNC: 0.03 IU/ML
M TB IFN-G BLD-IMP: NEGATIVE
M TB IFN-G CD4+ BCKGRND COR BLD-ACNC: 1.83 IU/ML
MITOGEN IGNF BCKGRD COR BLD-ACNC: 0 IU/ML
MITOGEN IGNF BCKGRD COR BLD-ACNC: 0 IU/ML
QUANTIFERON MITOGEN: 1.86 IU/ML
QUANTIFERON NIL TUBE: 0.03 IU/ML
QUANTIFERON TB1 TUBE: 0.03 IU/ML
QUANTIFERON TB2 TUBE: 0.03

## 2022-05-05 ENCOUNTER — LAB REQUISITION (OUTPATIENT)
Dept: LAB | Facility: CLINIC | Age: 57
End: 2022-05-05

## 2022-05-05 DIAGNOSIS — N17.9 ACUTE KIDNEY FAILURE, UNSPECIFIED (H): ICD-10-CM

## 2022-05-05 DIAGNOSIS — D64.9 ANEMIA, UNSPECIFIED: ICD-10-CM

## 2022-05-06 LAB
ANION GAP SERPL CALCULATED.3IONS-SCNC: 13 MMOL/L (ref 5–18)
BUN SERPL-MCNC: 16 MG/DL (ref 8–22)
CALCIUM SERPL-MCNC: 9.7 MG/DL (ref 8.5–10.5)
CHLORIDE BLD-SCNC: 103 MMOL/L (ref 98–107)
CO2 SERPL-SCNC: 25 MMOL/L (ref 22–31)
CREAT SERPL-MCNC: 1.19 MG/DL (ref 0.7–1.3)
ERYTHROCYTE [DISTWIDTH] IN BLOOD BY AUTOMATED COUNT: 19 % (ref 10–15)
GFR SERPL CREATININE-BSD FRML MDRD: 72 ML/MIN/1.73M2
GLUCOSE BLD-MCNC: 99 MG/DL (ref 70–125)
HCT VFR BLD AUTO: 28.8 % (ref 40–53)
HGB BLD-MCNC: 8.5 G/DL (ref 13.3–17.7)
MCH RBC QN AUTO: 26.1 PG (ref 26.5–33)
MCHC RBC AUTO-ENTMCNC: 29.5 G/DL (ref 31.5–36.5)
MCV RBC AUTO: 88 FL (ref 78–100)
PLATELET # BLD AUTO: 319 10E3/UL (ref 150–450)
POTASSIUM BLD-SCNC: 4.1 MMOL/L (ref 3.5–5)
RBC # BLD AUTO: 3.26 10E6/UL (ref 4.4–5.9)
SODIUM SERPL-SCNC: 141 MMOL/L (ref 136–145)
WBC # BLD AUTO: 6.2 10E3/UL (ref 4–11)

## 2022-05-06 PROCEDURE — 85027 COMPLETE CBC AUTOMATED: CPT | Performed by: NURSE PRACTITIONER

## 2022-05-06 PROCEDURE — P9604 ONE-WAY ALLOW PRORATED TRIP: HCPCS | Performed by: NURSE PRACTITIONER

## 2022-05-06 PROCEDURE — 80048 BASIC METABOLIC PNL TOTAL CA: CPT | Performed by: NURSE PRACTITIONER

## 2022-05-06 PROCEDURE — 36415 COLL VENOUS BLD VENIPUNCTURE: CPT | Performed by: NURSE PRACTITIONER

## 2022-05-15 ENCOUNTER — LAB REQUISITION (OUTPATIENT)
Dept: LAB | Facility: CLINIC | Age: 57
End: 2022-05-15

## 2022-05-15 DIAGNOSIS — I10 ESSENTIAL (PRIMARY) HYPERTENSION: ICD-10-CM

## 2022-05-17 LAB
ANION GAP SERPL CALCULATED.3IONS-SCNC: 8 MMOL/L (ref 5–18)
BUN SERPL-MCNC: 19 MG/DL (ref 8–22)
CALCIUM SERPL-MCNC: 9.8 MG/DL (ref 8.5–10.5)
CHLORIDE BLD-SCNC: 104 MMOL/L (ref 98–107)
CO2 SERPL-SCNC: 30 MMOL/L (ref 22–31)
CREAT SERPL-MCNC: 1.17 MG/DL (ref 0.7–1.3)
ERYTHROCYTE [DISTWIDTH] IN BLOOD BY AUTOMATED COUNT: 18.5 % (ref 10–15)
GFR SERPL CREATININE-BSD FRML MDRD: 73 ML/MIN/1.73M2
GLUCOSE BLD-MCNC: 117 MG/DL (ref 70–125)
HCT VFR BLD AUTO: 30.3 % (ref 40–53)
HGB BLD-MCNC: 9.1 G/DL (ref 13.3–17.7)
MCH RBC QN AUTO: 26.2 PG (ref 26.5–33)
MCHC RBC AUTO-ENTMCNC: 30 G/DL (ref 31.5–36.5)
MCV RBC AUTO: 87 FL (ref 78–100)
PLATELET # BLD AUTO: 225 10E3/UL (ref 150–450)
POTASSIUM BLD-SCNC: 4.1 MMOL/L (ref 3.5–5)
RBC # BLD AUTO: 3.47 10E6/UL (ref 4.4–5.9)
SODIUM SERPL-SCNC: 142 MMOL/L (ref 136–145)
WBC # BLD AUTO: 5.4 10E3/UL (ref 4–11)

## 2022-05-17 PROCEDURE — 85014 HEMATOCRIT: CPT | Performed by: NURSE PRACTITIONER

## 2022-05-17 PROCEDURE — P9604 ONE-WAY ALLOW PRORATED TRIP: HCPCS | Performed by: NURSE PRACTITIONER

## 2022-05-17 PROCEDURE — 82310 ASSAY OF CALCIUM: CPT | Performed by: NURSE PRACTITIONER

## 2022-05-17 PROCEDURE — 36415 COLL VENOUS BLD VENIPUNCTURE: CPT | Performed by: NURSE PRACTITIONER

## 2022-05-25 ENCOUNTER — LAB REQUISITION (OUTPATIENT)
Dept: LAB | Facility: CLINIC | Age: 57
End: 2022-05-25

## 2022-05-25 DIAGNOSIS — Z51.81 ENCOUNTER FOR THERAPEUTIC DRUG LEVEL MONITORING: ICD-10-CM

## 2022-05-25 DIAGNOSIS — I10 ESSENTIAL (PRIMARY) HYPERTENSION: ICD-10-CM

## 2022-05-26 LAB
ANION GAP SERPL CALCULATED.3IONS-SCNC: 6 MMOL/L (ref 5–18)
BUN SERPL-MCNC: 17 MG/DL (ref 8–22)
CALCIUM SERPL-MCNC: 10.1 MG/DL (ref 8.5–10.5)
CHLORIDE BLD-SCNC: 101 MMOL/L (ref 98–107)
CO2 SERPL-SCNC: 30 MMOL/L (ref 22–31)
CREAT SERPL-MCNC: 1.2 MG/DL (ref 0.7–1.3)
ERYTHROCYTE [DISTWIDTH] IN BLOOD BY AUTOMATED COUNT: 17.7 % (ref 10–15)
GFR SERPL CREATININE-BSD FRML MDRD: 71 ML/MIN/1.73M2
GLUCOSE BLD-MCNC: 291 MG/DL (ref 70–125)
HCT VFR BLD AUTO: 33.3 % (ref 40–53)
HGB BLD-MCNC: 10.3 G/DL (ref 13.3–17.7)
MCH RBC QN AUTO: 27.1 PG (ref 26.5–33)
MCHC RBC AUTO-ENTMCNC: 30.9 G/DL (ref 31.5–36.5)
MCV RBC AUTO: 88 FL (ref 78–100)
PLATELET # BLD AUTO: 244 10E3/UL (ref 150–450)
POTASSIUM BLD-SCNC: 4.3 MMOL/L (ref 3.5–5)
RBC # BLD AUTO: 3.8 10E6/UL (ref 4.4–5.9)
SODIUM SERPL-SCNC: 137 MMOL/L (ref 136–145)
WBC # BLD AUTO: 7.3 10E3/UL (ref 4–11)

## 2022-05-26 PROCEDURE — P9604 ONE-WAY ALLOW PRORATED TRIP: HCPCS | Performed by: INTERNAL MEDICINE

## 2022-05-26 PROCEDURE — 80048 BASIC METABOLIC PNL TOTAL CA: CPT | Performed by: INTERNAL MEDICINE

## 2022-05-26 PROCEDURE — 85027 COMPLETE CBC AUTOMATED: CPT | Performed by: INTERNAL MEDICINE

## 2022-05-26 PROCEDURE — 36415 COLL VENOUS BLD VENIPUNCTURE: CPT | Performed by: INTERNAL MEDICINE
